# Patient Record
Sex: MALE | Race: WHITE | NOT HISPANIC OR LATINO | URBAN - METROPOLITAN AREA
[De-identification: names, ages, dates, MRNs, and addresses within clinical notes are randomized per-mention and may not be internally consistent; named-entity substitution may affect disease eponyms.]

---

## 2017-09-29 ENCOUNTER — EMERGENCY (EMERGENCY)
Facility: HOSPITAL | Age: 57
LOS: 1 days | Discharge: HOME | End: 2017-09-29
Attending: EMERGENCY MEDICINE | Admitting: EMERGENCY MEDICINE
Payer: COMMERCIAL

## 2017-09-29 VITALS
DIASTOLIC BLOOD PRESSURE: 35 MMHG | RESPIRATION RATE: 16 BRPM | TEMPERATURE: 99 F | OXYGEN SATURATION: 100 % | HEART RATE: 58 BPM | SYSTOLIC BLOOD PRESSURE: 125 MMHG

## 2017-09-29 DIAGNOSIS — S01.81XA LACERATION WITHOUT FOREIGN BODY OF OTHER PART OF HEAD, INITIAL ENCOUNTER: ICD-10-CM

## 2017-09-29 DIAGNOSIS — S01.511A LACERATION WITHOUT FOREIGN BODY OF LIP, INITIAL ENCOUNTER: ICD-10-CM

## 2017-09-29 DIAGNOSIS — S40.212A ABRASION OF LEFT SHOULDER, INITIAL ENCOUNTER: ICD-10-CM

## 2017-09-29 PROCEDURE — 12013 RPR F/E/E/N/L/M 2.6-5.0 CM: CPT | Mod: 59

## 2017-09-29 PROCEDURE — 70450 CT HEAD/BRAIN W/O DYE: CPT | Mod: 26

## 2017-09-29 PROCEDURE — 99285 EMERGENCY DEPT VISIT HI MDM: CPT | Mod: 25

## 2017-09-29 PROCEDURE — 71010: CPT | Mod: 26

## 2017-09-29 PROCEDURE — 12051 INTMD RPR FACE/MM 2.5 CM/<: CPT

## 2017-09-29 PROCEDURE — 70486 CT MAXILLOFACIAL W/O DYE: CPT | Mod: 26
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
## 2017-09-29 NOTE — ED PROVIDER NOTE - MUSCULOSKELETAL, MLM
Mild tenderness to anterior lateral aspect of left shoulder. Spine appears normal, range of motion is not limited, no muscle or joint tenderness Mild tenderness to anterior lateral aspect of left shoulder (abrasion present)   ROM normal passive and active. Spine appears normal, range of motion is not limited, no muscle or joint tenderness

## 2017-09-29 NOTE — ED PROVIDER NOTE - CARE PLAN
Principal Discharge DX:	Fall (on) (from) unspecified stairs and steps, initial encounter  Secondary Diagnosis:	Lip injury, initial encounter

## 2017-09-29 NOTE — ED SUB INTERN NOTE - ENMT NEGATIVE STATEMENT, MLM
Ears: no ear pain and no hearing problems.Nose: no nasal congestion and no nasal drainage.  Noepistaxis .Mouth/Throat: no dysphagia, no hoarseness and no throat pain.Neck: no lumps, no pain, no stiffness and no swollen glands.

## 2017-09-29 NOTE — ED PROVIDER NOTE - SKIN, MLM
3cm abrasion to the anterior lateral aspect of left shoulder. 2cm lip laceration to the left upper lip extending through the vermilion boarder and 2cm into the mucosal surface. 1cm laceration to the supraorbital area of the left temple. 3cm abrasion to the anterior lateral aspect of left shoulder. 3 cm abraded, contused lip laceration to the left upper lip extending through the vermilion boarder and 2cm into the mucosal surface. 1cm laceration to the supraorbital area of the left temple.

## 2017-09-29 NOTE — ED PROCEDURE NOTE - CPROC ED TIME OUT STATEMENT1
“Patient's name, , procedure and correct site were confirmed during the [place] Timeout.”
“Patient's name, , procedure and correct site were confirmed during the [place] Timeout.”

## 2017-09-29 NOTE — ED SUB INTERN NOTE - GENERIC SYMPTOMS POSITIVE
pt presenting with mechanical fall due to malfunctioning assistive device, head trauma without LOC, able to ambulate without difficulty s/p fall full ROM of all extremities

## 2017-09-29 NOTE — ED SUB INTERN NOTE - GENERIC SYMPTOMS NEGATIVE FT
denies concussive symptoms, no c/w tenderness, no dizziness prior to fall, no c/p, no neck/spinal tenderness, no pelvic instability

## 2017-09-29 NOTE — ED PROVIDER NOTE - PROGRESS NOTE DETAILS
imaging unremarkable.  See procedure notes for detail.  Fall precautions discussed in detail.  This is his first fall in > 1 year.  Uses a cane to walk. Wound care discussed in detail as well.  Conservative management discussed with the patient in detail.  Close PMD follow up encouraged.  Strict ED return instructions discussed in detail and patient given the opportunity to ask any questions about their discharge diagnosis and instructions

## 2017-09-29 NOTE — ED PROVIDER NOTE - NEURO NEGATIVE STATEMENT, MLM
no loss of consciousness, no gait abnormality, no headache, no sensory deficits, and no weakness. no loss of consciousness,  no headache, no sensory deficits, and no weakness.

## 2017-09-29 NOTE — ED PROCEDURE NOTE - NS ED PROCEDURE ASSISTED BY
The procedure was performed independently

## 2017-09-29 NOTE — ED PROCEDURE NOTE - CPROC ED LACER REPAIR DETAIL1
No foreign body No foreign body/Extensive debridement was performed./The wound was explored to base in bloodless field. The wound was explored to base in bloodless field.

## 2017-09-29 NOTE — ED PROCEDURE NOTE - CPROC ED POST PROC CARE GUIDE1
Instructed patient/caregiver to follow-up with primary care physician./Keep the cast/splint/dressing clean and dry./Verbal/written post procedure instructions were given to patient/caregiver./Instructed patient/caregiver regarding signs and symptoms of infection.
Keep the cast/splint/dressing clean and dry./Verbal/written post procedure instructions were given to patient/caregiver./Instructed patient/caregiver to follow-up with primary care physician./Instructed patient/caregiver regarding signs and symptoms of infection.
Keep the cast/splint/dressing clean and dry./Instructed patient/caregiver regarding signs and symptoms of infection./Verbal/written post procedure instructions were given to patient/caregiver./Instructed patient/caregiver to follow-up with primary care physician.

## 2017-09-29 NOTE — ED PROVIDER NOTE - OBJECTIVE STATEMENT
58 yo M with presents s/p mechanical fall on the street c/o lip laceration. Pt states was walking when can got caught causing him to fall and land on left side of face, which now has abrasions to left side of the face, laceration to left upper lip, and mild shoulder pain. Denies LOC, N/V, HA, loose or painful teeth, fever, or chills. Pt notes tetanus is up to date. 56 yo M with presents s/p mechanical fall on the street c/o lip laceration. Pt states was walking when can got caught causing him to fall and land on left side of face, which now has abrasions to left side of the face, laceration to left upper lip, and mild shoulder pain. Denies LOC, N/V, HA, loose or painful teeth, fever, or chills. Pt notes tetanus is up to date.  NO premonitory symptoms.  Denies blood thinning medication.

## 2017-11-14 PROBLEM — Z00.00 ENCOUNTER FOR PREVENTIVE HEALTH EXAMINATION: Status: ACTIVE | Noted: 2017-11-14

## 2018-03-20 ENCOUNTER — APPOINTMENT (OUTPATIENT)
Dept: NEUROLOGY | Facility: CLINIC | Age: 58
End: 2018-03-20
Payer: SELF-PAY

## 2018-03-20 VITALS
DIASTOLIC BLOOD PRESSURE: 66 MMHG | SYSTOLIC BLOOD PRESSURE: 105 MMHG | HEART RATE: 57 BPM | WEIGHT: 173 LBS | BODY MASS INDEX: 24.77 KG/M2 | OXYGEN SATURATION: 95 % | HEIGHT: 70 IN

## 2018-03-20 DIAGNOSIS — G40.909 EPILEPSY, UNSPECIFIED, NOT INTRACTABLE, W/OUT STATUS EPILEPTICUS: ICD-10-CM

## 2018-03-20 PROCEDURE — 95816 EEG AWAKE AND DROWSY: CPT

## 2018-03-20 PROCEDURE — 99213 OFFICE O/P EST LOW 20 MIN: CPT

## 2018-06-05 RX ORDER — TAMSULOSIN HYDROCHLORIDE 0.4 MG/1
0.4 CAPSULE ORAL
Refills: 0 | Status: ACTIVE | COMMUNITY

## 2018-06-05 RX ORDER — AMANTADINE HYDROCHLORIDE 100 1/1
100 TABLET ORAL
Refills: 0 | Status: ACTIVE | COMMUNITY

## 2018-06-05 RX ORDER — LAMOTRIGINE 150 MG/1
150 TABLET ORAL
Qty: 60 | Refills: 11 | Status: ACTIVE | COMMUNITY
Start: 1900-01-01 | End: 1900-01-01

## 2018-06-05 RX ORDER — GUANFACINE 1 MG/1
1 TABLET, EXTENDED RELEASE ORAL
Refills: 0 | Status: ACTIVE | COMMUNITY

## 2018-08-03 PROBLEM — S06.9X9A UNSPECIFIED INTRACRANIAL INJURY WITH LOSS OF CONSCIOUSNESS OF UNSPECIFIED DURATION, INITIAL ENCOUNTER: Chronic | Status: ACTIVE | Noted: 2017-09-29

## 2018-08-03 PROBLEM — R56.9 UNSPECIFIED CONVULSIONS: Chronic | Status: ACTIVE | Noted: 2017-09-29

## 2018-08-07 ENCOUNTER — APPOINTMENT (OUTPATIENT)
Dept: NEUROLOGY | Facility: CLINIC | Age: 58
End: 2018-08-07
Payer: SELF-PAY

## 2018-08-07 VITALS
DIASTOLIC BLOOD PRESSURE: 74 MMHG | OXYGEN SATURATION: 96 % | HEART RATE: 66 BPM | WEIGHT: 170 LBS | SYSTOLIC BLOOD PRESSURE: 116 MMHG | BODY MASS INDEX: 25.18 KG/M2 | HEIGHT: 69 IN

## 2018-08-07 LAB
BASOPHILS # BLD AUTO: 0.01 K/UL
BASOPHILS NFR BLD AUTO: 0.3 %
EOSINOPHIL # BLD AUTO: 0.06 K/UL
EOSINOPHIL NFR BLD AUTO: 1.6 %
HCT VFR BLD CALC: 47.8 %
HGB BLD-MCNC: 15.7 G/DL
IMM GRANULOCYTES NFR BLD AUTO: 0.3 %
LYMPHOCYTES # BLD AUTO: 0.78 K/UL
LYMPHOCYTES NFR BLD AUTO: 21.4 %
MAN DIFF?: NORMAL
MCHC RBC-ENTMCNC: 29.8 PG
MCHC RBC-ENTMCNC: 32.8 GM/DL
MCV RBC AUTO: 90.9 FL
MONOCYTES # BLD AUTO: 0.33 K/UL
MONOCYTES NFR BLD AUTO: 9.1 %
NEUTROPHILS # BLD AUTO: 2.45 K/UL
NEUTROPHILS NFR BLD AUTO: 67.3 %
PLATELET # BLD AUTO: 246 K/UL
RBC # BLD: 5.26 M/UL
RBC # FLD: 12.9 %
WBC # FLD AUTO: 3.64 K/UL

## 2018-08-07 PROCEDURE — 99214 OFFICE O/P EST MOD 30 MIN: CPT

## 2018-08-08 LAB
ALBUMIN SERPL ELPH-MCNC: 4.9 G/DL
ALP BLD-CCNC: 78 U/L
ALT SERPL-CCNC: 16 U/L
ANION GAP SERPL CALC-SCNC: 13 MMOL/L
AST SERPL-CCNC: 17 U/L
BILIRUB SERPL-MCNC: 0.4 MG/DL
BUN SERPL-MCNC: 21 MG/DL
CALCIUM SERPL-MCNC: 9.6 MG/DL
CHLORIDE SERPL-SCNC: 103 MMOL/L
CO2 SERPL-SCNC: 27 MMOL/L
CREAT SERPL-MCNC: 1 MG/DL
FOLATE SERPL-MCNC: 8.8 NG/ML
GLUCOSE SERPL-MCNC: 80 MG/DL
POTASSIUM SERPL-SCNC: 5.5 MMOL/L
PROT SERPL-MCNC: 7 G/DL
SODIUM SERPL-SCNC: 143 MMOL/L
TSH SERPL-ACNC: 3.58 UIU/ML
VIT B12 SERPL-MCNC: <150 PG/ML

## 2018-08-09 LAB — LAMOTRIGINE SERPL-MCNC: 6.5 MCG/ML

## 2018-10-05 ENCOUNTER — EMERGENCY (EMERGENCY)
Facility: HOSPITAL | Age: 58
LOS: 1 days | Discharge: LEFT BEFORE DISCHARGE | End: 2018-10-05
Admitting: EMERGENCY MEDICINE

## 2018-10-05 VITALS
DIASTOLIC BLOOD PRESSURE: 72 MMHG | SYSTOLIC BLOOD PRESSURE: 107 MMHG | TEMPERATURE: 98 F | HEART RATE: 50 BPM | OXYGEN SATURATION: 98 % | RESPIRATION RATE: 16 BRPM

## 2018-10-05 DIAGNOSIS — Z04.3 ENCOUNTER FOR EXAMINATION AND OBSERVATION FOLLOWING OTHER ACCIDENT: ICD-10-CM

## 2018-10-05 NOTE — ED ADULT NURSE NOTE - OBJECTIVE STATEMENT
posterior head pain s/p striking head on a chair leg, denies loc, no s/s of distress, awaiting provider eval

## 2018-10-05 NOTE — ED ADULT TRIAGE NOTE - CHIEF COMPLAINT QUOTE
Patient s/p fall after getting up form around his desk, hitting his head. Denies any LOC or blood thinner use. Reports his left leg got  weak an he fell, endorse left lower ext weakness after hip surgery 2 yrs ago. Patient with h/o TBI

## 2018-10-05 NOTE — ED ADULT NURSE NOTE - NSIMPLEMENTINTERV_GEN_ALL_ED
Implemented All Universal Safety Interventions:  [place] to call system. Call bell, personal items and telephone within reach. Instruct patient to call for assistance. Room bathroom lighting operational. Non-slip footwear when patient is off stretcher. Physically safe environment: no spills, clutter or unnecessary equipment. Stretcher in lowest position, wheels locked, appropriate side rails in place.

## 2018-10-05 NOTE — ED ADULT NURSE NOTE - CHIEF COMPLAINT QUOTE
"Problem: Goal Outcome Summary  Goal: Goal Outcome Summary  Outcome: No Change  Patient VSS on RA. Patient tired but arouses to voice. He will look at me when I ask questions however would only answer \"no\" to pain. He wouldn't answer any other questions so BEVERLY orientation, or mental status on initial assessment. Patient has had a flat affect and quiet. LS diminished with shallow breaths. Abdomen concaved, hypoactive bowel sounds. This am patient alert and oriented to self patient states he can't remember.When reorienting patient got tearful. Patient has agreed  to drink, eat and take his medications this am.. IVF. Garcia is patent with adequate output patient states penis is tender to touch from garcia but declines intervention . Will continue monitoring      " Patient s/p fall after getting up form around his desk, hitting his head. Denies any LOC or blood thinner use. Reports his left leg got  weak an he fell, endorse left lower ext weakness after hip surgery 2 yrs ago. Patient with h/o TBI

## 2018-12-13 ENCOUNTER — OTHER (OUTPATIENT)
Age: 58
End: 2018-12-13

## 2019-01-10 ENCOUNTER — OTHER (OUTPATIENT)
Age: 59
End: 2019-01-10

## 2019-05-30 NOTE — ED PROCEDURE NOTE - NS_ATTENDINGSCRIBE_ED_ALL_ED
CHIEF COMPLAINT: Left Foot Surgical Postop    PROBLEMS:  FH DIABETES (ICD-V18.0) ([id number])    PATIENT REPORTED MEDICATIONS:  MELOXICAM TABLET (MELOXICAM TABS)   TRAMADOL HCL 50 MG ORAL TABLET (TRAMADOL HCL) 1-2 tabs q q 8-12 hrs prn pain; Route: ORAL  CITALOPRAM HYDROBROMIDE TABLET (CITALOPRAM HYDROBROMIDE TABS) 1 tab PO daily    PATIENT REPORTED ALLERGIES:  PENICILLIN (PENICILLIN V POTASSIUM) (SevereReaction: No reaction details noted)  ASPIRIN (SevereReaction: No reaction details noted)  PERCOCET (SevereReaction: No reaction details noted)      HISTORY OF PRESENT ILLNESS:    The patient is here six days out from gastroc recession, Achilles tendon debridement and posterior heel spur resection on the left.  Doing well.  No acute concerns.    PAST MEDICAL HISTORY:    Possible Arthritis    PAST ORTHOPEDIC SURGICAL HISTORY:    Posterior Heel Spur Resection, Achilles Tendon Debridement and Repair, and Gastroc Recession 2019, Melecio Moore DPM  Left Achilles Tendon Debridement And Repair; Heel Spur Resection Posterior Heel 2017, Melecio Moore DPM  Shoulder Surgery x4  Right Hand Surgery x5  Right Knee    PAST SURGICAL HISTORY:    Appendectomy  Cholecystectomy  Kidney Stone Surgery  Ablation  Tubal Ligation    FAMILY HISTORY:    Father:  , Heart Failure, Diabetes  Mother:  , Cancer, Blood Clots    SOCIAL HISTORY:   Occupation:  DSP  Marital Status:    Alcohol Use:  No  Tobacco Use:  Former  Secondhand Smoke Exposure:  No  History of HIV:  No  History of Hepatitis:  No    PHYSICAL EXAMINATION:    Surgical site well-healed.  Sutures removed and Steri-Strips applied.  She already has significant improvement in symptoms.  No overt concerns to the surgical sites.  It is well-coapted.  CMS is intact.    ASSESSMENT:    Six days out from gastroc recession, Achilles tendon debridement and posterior heel spur resection on the left.  Doing well.    PLAN:   Discuss progression of 
postoperative course.  At this point she was put in a compression sock and a boot.  She can weight bear as tolerated in the boot.  She will stay in the boot for a month.  I will see her back at that time and progress as able from there.    Dictated by Melecio Moore DPM  cc:  DO Dr. Oscar Robin at [facility]    D:  05/23/2019  T:  05/29/2019    Typed and/or reviewed and corrected by signing  below, and sent to the Physician for final review and signature.    This report was created using voice recording software and computer-generated templates. Although every effort has been made to review for and eliminate errors, some errors may still occur.         Electronically signed by Walter Dobbs -  on 05/30/2019 at 12:56 PM    Electronically signed by Melecio Moore DPM on 05/30/2019 at 1:21 PM  
I personally performed the service described in the documentation recorded by the scribe in my presence, and it accurately and completely records my words and actions.

## 2019-08-02 ENCOUNTER — OTHER (OUTPATIENT)
Age: 59
End: 2019-08-02

## 2019-12-03 ENCOUNTER — APPOINTMENT (OUTPATIENT)
Dept: NEUROLOGY | Facility: CLINIC | Age: 59
End: 2019-12-03

## 2020-02-21 ENCOUNTER — EMERGENCY (EMERGENCY)
Facility: HOSPITAL | Age: 60
LOS: 1 days | Discharge: HOME | End: 2020-02-21
Attending: EMERGENCY MEDICINE | Admitting: EMERGENCY MEDICINE
Payer: COMMERCIAL

## 2020-02-21 VITALS
OXYGEN SATURATION: 95 % | HEART RATE: 51 BPM | SYSTOLIC BLOOD PRESSURE: 104 MMHG | WEIGHT: 179.9 LBS | RESPIRATION RATE: 17 BRPM | DIASTOLIC BLOOD PRESSURE: 69 MMHG | TEMPERATURE: 97 F | HEIGHT: 68 IN

## 2020-02-21 PROCEDURE — 70450 CT HEAD/BRAIN W/O DYE: CPT | Mod: 26

## 2020-02-21 PROCEDURE — 99053 MED SERV 10PM-8AM 24 HR FAC: CPT

## 2020-02-21 PROCEDURE — 99284 EMERGENCY DEPT VISIT MOD MDM: CPT
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
## 2020-02-21 NOTE — ED PROVIDER NOTE - CPE EDP ENMT NORM
- - - Benzoyl Peroxide Pregnancy And Lactation Text: This medication is Pregnancy Category C. It is unknown if benzoyl peroxide is excreted in breast milk.

## 2020-02-21 NOTE — ED PROVIDER NOTE - PATIENT PORTAL LINK FT
You can access the FollowMyHealth Patient Portal offered by [facility] by registering at the following website: [email or web address]. By joining [employer]’s FollowMyHealth portal, you will also be able to view your health information using other applications (apps) compatible with our system.
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
## 2020-02-21 NOTE — ED ADULT NURSE NOTE - CHPI ED NUR SYMPTOMS NEG
no blurred vision/no dizziness/no loss of consciousness/no change in level of consciousness/no confusion/no vomiting/no weakness/no fever/no nausea/no numbness

## 2020-02-21 NOTE — ED ADULT TRIAGE NOTE - CHIEF COMPLAINT QUOTE
Biba for mechanical slip and fall OOB with + head injury. PT A/Ox4 during triage, denies LOC. No blood thinner use. PT states he was able to get up by himself after fall.  PT reports "mild headache". Denies any other pain or injuries. States he was advised by PMD to come to ER. [facility] TBI 1987.
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
## 2020-02-25 DIAGNOSIS — S09.90XA UNSPECIFIED INJURY OF HEAD, INITIAL ENCOUNTER: ICD-10-CM
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
## 2020-04-01 ENCOUNTER — APPOINTMENT (OUTPATIENT)
Dept: NEUROLOGY | Facility: CLINIC | Age: 60
End: 2020-04-01

## 2020-06-04 NOTE — ED SUB INTERN NOTE - SKIN LOCATION #2
COVID-19 SCREENING    Do you or any of your household members have the following symptoms:  Fever >100.4*F or >38.0*C: No    New or worsening cough, shortness of breath, or sore throat: No    New onset of nausea, vomiting or diarrhea: No    New onset of loss of taste or smell, chills, repeated shaking with chills, muscle pain, or headache: No    Have you or a household member tested positive for COVID-19 in the last 14 days?: No    Emergent COVID-19 Symptoms requiring Nurse Triage:  Trouble breathing, Persistent pain or pressure in the chest, New confusion, Inability to wake or stay awake, Bluish lips or face    If any of the above questions are positive and the patient is not having emergent symptoms - order COVID19 lab test, register, & schedule patient for community testing.     DO NOT schedule any other appointments unless directed by a facility or provider (ex:  being discharged from hospital needing 1st pediatric appointment, TIA clinic patients, etc). Appointments can be canceled, but DO NOT schedule/reschedule patient requested appointments.       
eye

## 2021-12-12 NOTE — ED PROCEDURE NOTE - EBL
INCOMPLETE NOTE
minimal
minimal
minimal

## 2022-07-27 NOTE — ED PROVIDER NOTE - PRINCIPAL DIAGNOSIS
FREE:[LAST:[[facility]],PHONE:[phone]],FAX:[(   )    -]] Fall (on) (from) unspecified stairs and steps, initial encounter

## 2023-04-18 ENCOUNTER — NON-APPOINTMENT (OUTPATIENT)
Age: 63
End: 2023-04-18

## 2023-04-20 ENCOUNTER — NON-APPOINTMENT (OUTPATIENT)
Age: 63
End: 2023-04-20

## 2024-09-28 ENCOUNTER — EMERGENCY (EMERGENCY)
Facility: HOSPITAL | Age: 64
LOS: 1 days | Discharge: HOME | End: 2024-09-28
Attending: STUDENT IN AN ORGANIZED HEALTH CARE EDUCATION/TRAINING PROGRAM | Admitting: STUDENT IN AN ORGANIZED HEALTH CARE EDUCATION/TRAINING PROGRAM
Payer: COMMERCIAL

## 2024-09-28 VITALS
TEMPERATURE: 98 F | HEART RATE: 66 BPM | DIASTOLIC BLOOD PRESSURE: 74 MMHG | OXYGEN SATURATION: 98 % | RESPIRATION RATE: 18 BRPM | SYSTOLIC BLOOD PRESSURE: 132 MMHG

## 2024-09-28 VITALS
TEMPERATURE: 98 F | HEART RATE: 53 BPM | SYSTOLIC BLOOD PRESSURE: 137 MMHG | RESPIRATION RATE: 18 BRPM | OXYGEN SATURATION: 97 % | DIASTOLIC BLOOD PRESSURE: 75 MMHG

## 2024-09-28 PROCEDURE — 99284 EMERGENCY DEPT VISIT MOD MDM: CPT

## 2024-09-28 PROCEDURE — 99283 EMERGENCY DEPT VISIT LOW MDM: CPT

## 2024-09-28 PROCEDURE — 82962 GLUCOSE BLOOD TEST: CPT

## 2024-09-28 NOTE — ED PROVIDER NOTE - CLINICAL SUMMARY MEDICAL DECISION MAKING FREE TEXT BOX
64-year-old male presenting after mechanical fall, no head trauma or LOC, requesting to leave, currently pain-free, discussed with his , stable for discharge, given return precautions.

## 2024-09-28 NOTE — ED PROVIDER NOTE - PHYSICAL EXAMINATION
general: Well appearing, in no acute distress  HEENT: Normocephalic, atraumatic, extraocular movements intact  CV: Regular rate  Pulm: No respiratory distress, no tachypnea  Abd: Flat, no gross distension, soft, nontender, no rebound or guarding  Ext: warm and well perfused, no bony tenderness of hip or pelvis, full range of motion  Skin: No gross rashes or lesions  Neuro: Alert and oriented, moving all extremities, ambulating without assistance at baseline
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
## 2024-09-28 NOTE — ED PROVIDER NOTE - PATIENT PORTAL LINK FT
You can access the FollowMyHealth Patient Portal offered by [facility] by registering at the following website: [email or web address]. By joining [employer]’s FollowMyHealth portal, you will also be able to view your health information using other applications (apps) compatible with our system.

## 2024-09-28 NOTE — ED ADULT NURSE NOTE - OBJECTIVE STATEMENT
Patient w/ PMHx TBI, arrives w/ bilateral lower leg brace and HHA at bedside, states mechanical slip and fall at home, landed on his buttocks, denies hitting head or LOC, not on blood thinners. States has mild lower back pain.

## 2024-09-28 NOTE — ED ADULT NURSE NOTE - NSFALLRISKINTERV_ED_ALL_ED

## 2024-09-28 NOTE — ED PROVIDER NOTE - NS ED ROS FT
16w3d    Patient calls to report \"Saturday I took a longer bath about an hour 40 minutes, toward the end the water started getting cold and I got out and then couldn't get warm and my legs became crampy.  Now I feel like I have a sinus infection and have a cough.\"  Patient notified of pregnancy safe medications to take from approved medication list and notified should be seen in urgent care if sinus infection symptoms persist.  Patient also did report small light pink spotting noticed once today when wiping after using the bathroom. Patient denies recent intercourse or bowel movement but has been coughing lots. Patient is not needing to wear a pad.  Writer encouraged patient to continue to monitor and call if bleeding increases or changes.  
Constitutional: No fever or chills  Eyes: No discharge or drainage  Ears, Nose, Mouth, Throat: No nasal discharge, no sore throat  Cardiovascular: No chest pain, no palpitations  Respiratory: No shortness of breath, no cough  Gastrointestinal: No nausea or vomiting, no abdominal pain, no diarrhea or constipation  Musculoskeletal: No joint pain, no swelling  Skin: No rashes or lesions  Neurological: No numbness, weakness, tingling, no headache

## 2024-09-28 NOTE — ED ADULT TRIAGE NOTE - CHIEF COMPLAINT QUOTE
63 y/o male BIBEMS for evaluation of pain to buttocks and lower back after fall today at 9 AM, Pt with hx of TIB, wears leg braces, walks with walker. As per caregiver patient was undecided to come to hospital stating "I want to go to  hospital" then "no I don't want to go"

## 2024-09-28 NOTE — ED PROVIDER NOTE - NSFOLLOWUPINSTRUCTIONS_ED_ALL_ED_FT
Please take Tylenol or Motrin as needed, apply ice to the area.  Return for any worsening symptoms.  Otherwise, please follow-up with your primary physician.

## 2024-09-28 NOTE — ED ADULT NURSE REASSESSMENT NOTE - NS ED NURSE REASSESS COMMENT FT1
Patient oriented to self, place and situation, states not in any pain and wants to leave.  Vital signs stable.  Dr. Galindo spoke w/ patient's health care manager about discharge.  Patient able to ambulate as per baseline, HHA at bedside w/ patient.  Disposition pending.

## 2024-10-01 DIAGNOSIS — M54.50 LOW BACK PAIN, UNSPECIFIED: ICD-10-CM
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
## 2024-10-08 ENCOUNTER — EMERGENCY (EMERGENCY)
Facility: HOSPITAL | Age: 64
LOS: 1 days | Discharge: LEFT BEFORE DISCHARGE | End: 2024-10-08
Attending: EMERGENCY MEDICINE | Admitting: EMERGENCY MEDICINE
Payer: COMMERCIAL

## 2024-10-08 VITALS
DIASTOLIC BLOOD PRESSURE: 64 MMHG | OXYGEN SATURATION: 92 % | TEMPERATURE: 98 F | HEART RATE: 55 BPM | SYSTOLIC BLOOD PRESSURE: 102 MMHG | RESPIRATION RATE: 18 BRPM | WEIGHT: 184.97 LBS

## 2024-10-08 DIAGNOSIS — R06.02 SHORTNESS OF BREATH: ICD-10-CM

## 2024-10-08 DIAGNOSIS — I44.0 ATRIOVENTRICULAR BLOCK, FIRST DEGREE: ICD-10-CM

## 2024-10-08 DIAGNOSIS — Z53.29 PROCEDURE AND TREATMENT NOT CARRIED OUT BECAUSE OF PATIENT'S DECISION FOR OTHER REASONS: ICD-10-CM

## 2024-10-08 DIAGNOSIS — F32.A DEPRESSION, UNSPECIFIED: ICD-10-CM

## 2024-10-08 LAB
ALBUMIN SERPL ELPH-MCNC: 4 G/DL — SIGNIFICANT CHANGE UP (ref 3.3–5)
ALP SERPL-CCNC: 109 U/L — SIGNIFICANT CHANGE UP (ref 40–120)
ALT FLD-CCNC: 17 U/L — SIGNIFICANT CHANGE UP (ref 10–45)
ANION GAP SERPL CALC-SCNC: 10 MMOL/L — SIGNIFICANT CHANGE UP (ref 5–17)
AST SERPL-CCNC: 16 U/L — SIGNIFICANT CHANGE UP (ref 10–40)
BASE EXCESS BLDV CALC-SCNC: 6.9 MMOL/L — HIGH (ref -2–3)
BASOPHILS # BLD AUTO: 0.02 K/UL — SIGNIFICANT CHANGE UP (ref 0–0.2)
BASOPHILS NFR BLD AUTO: 0.3 % — SIGNIFICANT CHANGE UP (ref 0–2)
BILIRUB SERPL-MCNC: 0.2 MG/DL — SIGNIFICANT CHANGE UP (ref 0.2–1.2)
BUN SERPL-MCNC: 14 MG/DL — SIGNIFICANT CHANGE UP (ref 7–23)
CALCIUM SERPL-MCNC: 8.7 MG/DL — SIGNIFICANT CHANGE UP (ref 8.4–10.5)
CHLORIDE SERPL-SCNC: 102 MMOL/L — SIGNIFICANT CHANGE UP (ref 96–108)
CO2 BLDV-SCNC: 34 MMOL/L — HIGH (ref 22–26)
CO2 SERPL-SCNC: 28 MMOL/L — SIGNIFICANT CHANGE UP (ref 22–31)
CREAT SERPL-MCNC: 0.89 MG/DL — SIGNIFICANT CHANGE UP (ref 0.5–1.3)
EGFR: 96 ML/MIN/1.73M2 — SIGNIFICANT CHANGE UP
EOSINOPHIL # BLD AUTO: 0.1 K/UL — SIGNIFICANT CHANGE UP (ref 0–0.5)
EOSINOPHIL NFR BLD AUTO: 1.5 % — SIGNIFICANT CHANGE UP (ref 0–6)
FLUAV AG NPH QL: SIGNIFICANT CHANGE UP
FLUBV AG NPH QL: SIGNIFICANT CHANGE UP
GAS PNL BLDV: SIGNIFICANT CHANGE UP
GLUCOSE SERPL-MCNC: 103 MG/DL — HIGH (ref 70–99)
HCO3 BLDV-SCNC: 32 MMOL/L — HIGH (ref 22–29)
HCT VFR BLD CALC: 39.8 % — SIGNIFICANT CHANGE UP (ref 39–50)
HGB BLD-MCNC: 13.2 G/DL — SIGNIFICANT CHANGE UP (ref 13–17)
IMM GRANULOCYTES NFR BLD AUTO: 0.3 % — SIGNIFICANT CHANGE UP (ref 0–0.9)
LYMPHOCYTES # BLD AUTO: 0.57 K/UL — LOW (ref 1–3.3)
LYMPHOCYTES # BLD AUTO: 8.4 % — LOW (ref 13–44)
MAGNESIUM SERPL-MCNC: 2.2 MG/DL — SIGNIFICANT CHANGE UP (ref 1.6–2.6)
MCHC RBC-ENTMCNC: 29.6 PG — SIGNIFICANT CHANGE UP (ref 27–34)
MCHC RBC-ENTMCNC: 33.2 GM/DL — SIGNIFICANT CHANGE UP (ref 32–36)
MCV RBC AUTO: 89.2 FL — SIGNIFICANT CHANGE UP (ref 80–100)
MONOCYTES # BLD AUTO: 0.47 K/UL — SIGNIFICANT CHANGE UP (ref 0–0.9)
MONOCYTES NFR BLD AUTO: 6.9 % — SIGNIFICANT CHANGE UP (ref 2–14)
NEUTROPHILS # BLD AUTO: 5.62 K/UL — SIGNIFICANT CHANGE UP (ref 1.8–7.4)
NEUTROPHILS NFR BLD AUTO: 82.6 % — HIGH (ref 43–77)
NRBC # BLD: 0 /100 WBCS — SIGNIFICANT CHANGE UP (ref 0–0)
NT-PROBNP SERPL-SCNC: 65 PG/ML — SIGNIFICANT CHANGE UP (ref 0–300)
PCO2 BLDV: 49 MMHG — SIGNIFICANT CHANGE UP (ref 42–55)
PH BLDV: 7.43 — SIGNIFICANT CHANGE UP (ref 7.32–7.43)
PLATELET # BLD AUTO: 253 K/UL — SIGNIFICANT CHANGE UP (ref 150–400)
PO2 BLDV: 62 MMHG — HIGH (ref 25–45)
POTASSIUM SERPL-MCNC: 3.8 MMOL/L — SIGNIFICANT CHANGE UP (ref 3.5–5.3)
POTASSIUM SERPL-SCNC: 3.8 MMOL/L — SIGNIFICANT CHANGE UP (ref 3.5–5.3)
PROT SERPL-MCNC: 6.5 G/DL — SIGNIFICANT CHANGE UP (ref 6–8.3)
RBC # BLD: 4.46 M/UL — SIGNIFICANT CHANGE UP (ref 4.2–5.8)
RBC # FLD: 12.1 % — SIGNIFICANT CHANGE UP (ref 10.3–14.5)
RSV RNA NPH QL NAA+NON-PROBE: SIGNIFICANT CHANGE UP
SAO2 % BLDV: 94.3 % — HIGH (ref 67–88)
SARS-COV-2 RNA SPEC QL NAA+PROBE: SIGNIFICANT CHANGE UP
SODIUM SERPL-SCNC: 140 MMOL/L — SIGNIFICANT CHANGE UP (ref 135–145)
WBC # BLD: 6.8 K/UL — SIGNIFICANT CHANGE UP (ref 3.8–10.5)
WBC # FLD AUTO: 6.8 K/UL — SIGNIFICANT CHANGE UP (ref 3.8–10.5)

## 2024-10-08 PROCEDURE — 36415 COLL VENOUS BLD VENIPUNCTURE: CPT

## 2024-10-08 PROCEDURE — 71046 X-RAY EXAM CHEST 2 VIEWS: CPT

## 2024-10-08 PROCEDURE — 87637 SARSCOV2&INF A&B&RSV AMP PRB: CPT

## 2024-10-08 PROCEDURE — 83735 ASSAY OF MAGNESIUM: CPT

## 2024-10-08 PROCEDURE — 99285 EMERGENCY DEPT VISIT HI MDM: CPT

## 2024-10-08 PROCEDURE — 85025 COMPLETE CBC W/AUTO DIFF WBC: CPT

## 2024-10-08 PROCEDURE — 82803 BLOOD GASES ANY COMBINATION: CPT

## 2024-10-08 PROCEDURE — 99283 EMERGENCY DEPT VISIT LOW MDM: CPT | Mod: 25

## 2024-10-08 PROCEDURE — 71046 X-RAY EXAM CHEST 2 VIEWS: CPT | Mod: 26

## 2024-10-08 PROCEDURE — 83880 ASSAY OF NATRIURETIC PEPTIDE: CPT

## 2024-10-08 PROCEDURE — 80053 COMPREHEN METABOLIC PANEL: CPT

## 2024-10-08 NOTE — ED ADULT NURSE NOTE - OBJECTIVE STATEMENT
pt recently discharged from [facility] today at 6pm sent home and brought to the ED for hypoxia. as per EMS, pt was saturating 80s on room air with cyanotic lips. pt placed on 4L NC saturating at 94%. no use of accessory muscles or retractions noted. respirations are unlabored. no cough noted. as per HHA, pt does not use oxygen at home. denies any complaints, wants to go home.

## 2024-10-08 NOTE — ED ADULT NURSE NOTE - NSFALLUNIVINTERV_ED_ALL_ED
Bed/Stretcher in lowest position, wheels locked, appropriate side rails in place/Call bell, personal items and telephone in reach/Instruct patient to call for assistance before getting out of bed/chair/stretcher/Non-slip footwear applied when patient is off stretcher/[place] to call system/Physically safe environment - no spills, clutter or unnecessary equipment/Purposeful proactive rounding/Room/bathroom lighting operational, light cord in reach

## 2024-10-09 VITALS
TEMPERATURE: 98 F | RESPIRATION RATE: 16 BRPM | SYSTOLIC BLOOD PRESSURE: 125 MMHG | DIASTOLIC BLOOD PRESSURE: 83 MMHG | HEART RATE: 57 BPM | OXYGEN SATURATION: 94 %

## 2024-10-09 RX ORDER — IPRATROPIUM BROMIDE AND ALBUTEROL SULFATE .5; 3 MG/3ML; MG/3ML
3 SOLUTION RESPIRATORY (INHALATION) ONCE
Refills: 0 | Status: COMPLETED | OUTPATIENT
Start: 2024-10-09 | End: 2024-10-09

## 2024-10-09 NOTE — ED PROVIDER NOTE - PROGRESS NOTE DETAILS
Attempted to discussed with patient opportunity to perform advanced imaging continued monitoring albuterol treatment patient refused understands risks versus benefits states he feels fine and he is just anxious wants to go home does not want a be in the hospital DC AMA return precautions given discussed case with patient's caregiver and manager has 24/7 care will monitor home advised caregivers manager to get portable pulse oximeter and check oxygenation and to call EMS if oxygen falls below 93%  or if he becomes dyspneic

## 2024-10-09 NOTE — ED PROVIDER NOTE - NSFOLLOWUPINSTRUCTIONS_ED_ALL_ED_FT
Shortness of Breath, Adult  Shortness of breath is when a person has trouble breathing or when a person feels like she or he is having trouble breathing in enough air. Shortness of breath could be a sign of a medical problem.    Follow these instructions at home:  A sign showing that a person should not smoke.  Pollutants    Do not use any products that contain nicotine or tobacco. These products include cigarettes, chewing tobacco, and vaping devices, such as e-cigarettes. This also includes cigars and pipes. If you need help quitting, ask your health care provider.  Avoid things that can irritate your airways, including:  Smoke. This includes campfire smoke, forest fire smoke, and secondhand smoke from tobacco products. Do not smoke or allow others to smoke in your home.  Mold.  Dust.  Air pollution.  Chemical fumes.  Things that can give you an allergic reaction (allergens) if you have allergies. Common allergens include pollen from grasses or trees and animal dander.  Keep your living space clean and free of mold and dust.  General instructions    Pay attention to any changes in your symptoms.  Take over-the-counter and prescription medicines only as told by your health care provider. This includes oxygen therapy and inhaled medicines.  Rest as needed.  Return to your normal activities as told by your health care provider. Ask your health care provider what activities are safe for you.  Keep all follow-up visits. This is important.  Contact a health care provider if:  Your condition does not improve as soon as expected.  You have a hard time doing your normal activities, even after you rest.  You have new symptoms.  You cannot walk up stairs or exercise the way that you normally do.  Get help right away if:  Your shortness of breath gets worse.  You have shortness of breath when you are resting.  You feel light-headed or you faint.  You have a cough that is not controlled with medicines.  You cough up blood.  You have pain with breathing.  You have pain in your chest, arms, shoulders, or abdomen.  You have a fever.  These symptoms may be an emergency. Get help right away. Call 911.  Do not wait to see if the symptoms will go away.  Do not drive yourself to the hospital.  Summary  Shortness of breath is when a person has trouble breathing enough air. It can be a sign of a medical problem.  Avoid things that irritate your lungs, such as smoking, pollution, mold, and dust.  Pay attention to changes in your symptoms and contact your health care provider if you have a hard time completing daily activities because of shortness of breath.  This information is not intended to replace advice given to you by your health care provider. Make sure you discuss any questions you have with your health care provider.

## 2024-10-09 NOTE — ED PROVIDER NOTE - PHYSICAL EXAMINATION
VITAL SIGNS: I have reviewed nursing notes and confirm.  CONSTITUTIONAL: Well appearing, in no acute distress.  intermittently slurring words when becoming more and more anxious and then back to baseline states that this is his baseline and it is unchanged  SKIN:  warm and dry, no acute rash.   HEAD:  normocephalic, atraumatic.  EYES: EOM intact; conjunctiva and sclera clear.  ENT: No nasal discharge; airway clear.   NECK: Supple.  CARD: S1, S2, Regular rate and rhythm.   RESP:  Clear to auscultation b/l, no wheezes, rales or rhonchi.Speaking in full sentences  ABD: Normal bowel sounds; soft; non-distended; non-tender; no guarding/ rebound.  EXT: Normal ROM. No peripheral edema. Pulses intact and equal b/l.  NEURO: Alert, oriented, grossly unremarkable, intermittently slurs words when becoming anxious has a history of TBI states this is his baseline and is unchanged  PSYCH: Cooperative, mood and affect appropriate. has capacity to make decisions

## 2024-10-09 NOTE — ED PROVIDER NOTE - PATIENT PORTAL LINK FT
You can access the FollowMyHealth Patient Portal offered by [facility] by registering at the following website: [email or web address]. By joining [email or web address]’s FollowMyHealth portal, you will also be able to view your health information using other applications (apps) compatible with our system.

## 2024-10-09 NOTE — ED PROVIDER NOTE - CLINICAL SUMMARY MEDICAL DECISION MAKING FREE TEXT BOX
64-year-old male here for shortness of breath preordered on prior to my evaluation CBC CMP proBNP chest x-ray ordered patient 93% on room air does not feel short of breath does not want any additional treatment offered patient advanced imaging of the chest including CTA chest and albuterol treatment patient refused states he wants to go home has the capacity to do so discussed with caregiver and caregivers manager patient has 24/7 care at home as well as PT caregiver states that patient has been weaker since he got home, caregivers manager states that they have been trying to get the patient into [facility].  Patient refusing all further care will DC home   EKG NSR 51 normal axis intervals first-degree AV block no acute ischemia no STEMI

## 2024-10-09 NOTE — ED PROVIDER NOTE - OBJECTIVE STATEMENT
64-year-old male brought in by ambulance from home after being discharged from [facility] and admission for dysphagia and stroke workup which was negative history of TBI anxiety depression on risperidone called EMS when he got home from [facility] for shortness of breath anxiety BLS crew arrived did not check waveform but noted hypoxia to 84% on room air put on supplemental O2 and brought to ED.  Patient's caregivers and  requested to be taken to [facility] EMS refused and stated to the patient that they have to go to the closest hospital.  Patient denies chest pain shortness of breath when asked what is going on patient states that he had anxiety and that he does not need to be here.  Otherwise feels well.  Wants to go home.

## 2024-11-18 ENCOUNTER — NON-APPOINTMENT (OUTPATIENT)
Age: 64
End: 2024-11-18

## 2024-11-26 NOTE — ED ADULT NURSE NOTE - NSINTERVENTIONOPT_GEN_ALL_ED
Received fax from [facility] pharmacy requesting refill on levothyroxine 150 mcg. Refill sent.  
Hourly Rounding

## 2025-01-11 ENCOUNTER — NON-APPOINTMENT (OUTPATIENT)
Age: 65
End: 2025-01-11

## 2025-02-05 NOTE — ED ADULT NURSE NOTE - PMH
Goal Outcome Evaluation:       Pt admitted for CA/P.PCI due to abnormal stress test and dyspnea on exertion. Pt prepped and ready for procedure. Pt's friend Yuni is here for support.      Christel Valdez RN                    Seizures    Traumatic brain injury

## 2025-02-14 ENCOUNTER — EMERGENCY (EMERGENCY)
Facility: HOSPITAL | Age: 65
LOS: 1 days | Discharge: HOME | End: 2025-02-14
Attending: STUDENT IN AN ORGANIZED HEALTH CARE EDUCATION/TRAINING PROGRAM | Admitting: STUDENT IN AN ORGANIZED HEALTH CARE EDUCATION/TRAINING PROGRAM
Payer: SELF-PAY

## 2025-02-14 VITALS
SYSTOLIC BLOOD PRESSURE: 117 MMHG | TEMPERATURE: 98 F | DIASTOLIC BLOOD PRESSURE: 77 MMHG | HEIGHT: 69 IN | HEART RATE: 60 BPM | RESPIRATION RATE: 18 BRPM | OXYGEN SATURATION: 95 % | WEIGHT: 199.96 LBS

## 2025-02-14 PROCEDURE — 71250 CT THORAX DX C-: CPT | Mod: 26

## 2025-02-14 PROCEDURE — 99284 EMERGENCY DEPT VISIT MOD MDM: CPT

## 2025-02-14 PROCEDURE — 99284 EMERGENCY DEPT VISIT MOD MDM: CPT | Mod: 25

## 2025-02-14 PROCEDURE — 71250 CT THORAX DX C-: CPT | Mod: MC

## 2025-02-14 RX ORDER — ACETAMINOPHEN 160 MG/5ML
1000 SUSPENSION ORAL ONCE
Refills: 0 | Status: COMPLETED | OUTPATIENT
Start: 2025-02-14 | End: 2025-02-14

## 2025-02-14 RX ORDER — IBUPROFEN 600 MG/1
600 TABLET, FILM COATED ORAL ONCE
Refills: 0 | Status: COMPLETED | OUTPATIENT
Start: 2025-02-14 | End: 2025-02-14

## 2025-02-14 RX ADMIN — IBUPROFEN 600 MILLIGRAM(S): 600 TABLET, FILM COATED ORAL at 16:16

## 2025-02-14 RX ADMIN — ACETAMINOPHEN 1000 MILLIGRAM(S): 160 SUSPENSION ORAL at 15:39

## 2025-02-14 NOTE — ED PROVIDER NOTE - NS ED ROS FT
Constitutional: No fever or chills  Eyes: No discharge or drainage  Ears, Nose, Mouth, Throat: No nasal discharge, no sore throat  Cardiovascular: No chest pain, no palpitations, + rib pain  Respiratory: No shortness of breath, no cough  Gastrointestinal: No nausea or vomiting, no abdominal pain, no diarrhea or constipation  Musculoskeletal: No joint pain, no swelling  Skin: No rashes or lesions  Neurological: No numbness, weakness, tingling, no headache  Psychiatric: No depression

## 2025-02-14 NOTE — ED PROVIDER NOTE - PHYSICAL EXAMINATION
general: Well appearing, in no acute distress  HEENT: Normocephalic, atraumatic, extraocular movements intact  CV: Regular rate  Pulm: No respiratory distress, no tachypnea, ttp of l ribs  Abd: Flat, no gross distension, soft, ntnd  Ext: warm and well perfused  Skin: No gross rashes or lesions  Back:  No midline c/t/l spine ttp  Neuro: Alert and oriented, moving all extremities, cn ii-xii intact, motor and sensation inatct bilaterally

## 2025-02-14 NOTE — ED ADULT NURSE NOTE - OBJECTIVE STATEMENT
65M / hx of TBI, anxiety , depression, [facility] ED  [facility] endorsing  rib pain . Patient denies HS/LOC.

## 2025-02-14 NOTE — ED ADULT NURSE NOTE - NSFALLRISKINTERV_ED_ALL_ED

## 2025-02-14 NOTE — ED ADULT TRIAGE NOTE - CHIEF COMPLAINT QUOTE
Pt presents to ED BIBA from home with aide s/p mechanical fall at home and landed on his L rib now c/o pain, denies head injury, pt has hx of TBI has normal slow speech at baseline. Pt A&Ox4, NAD. EKG in prog. Pt normally walks with walker at home.

## 2025-02-14 NOTE — ED PROVIDER NOTE - PATIENT PORTAL LINK FT
You can access the FollowMyHealth Patient Portal offered by [facility] by registering at the following website: [email or web address]. By joining [email or web address]’s FollowMyHealth portal, you will also be able to view your health information using other applications (apps) compatible with our system.

## 2025-02-14 NOTE — ED PROVIDER NOTE - OBJECTIVE STATEMENT
64 yo with ho TBI, anxiety, depression presenting after mechanical fall. Fell onto chest, complaining of L rib pain. No head trauma or LOC. No ha or blurry vision, no cp. No abd pain. No numbness, weakness, tingling.  No neck pain, back pain, extremity pain. No other acute complaints. ROS as above.
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
## 2025-02-17 DIAGNOSIS — F32.A DEPRESSION, UNSPECIFIED: ICD-10-CM
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
## 2025-02-17 DIAGNOSIS — R07.81 PLEURODYNIA: ICD-10-CM

## 2025-02-17 DIAGNOSIS — F41.9 ANXIETY DISORDER, UNSPECIFIED: ICD-10-CM

## 2025-02-17 DIAGNOSIS — S29.9XXA UNSPECIFIED INJURY OF THORAX, INITIAL ENCOUNTER: ICD-10-CM

## 2025-06-09 ENCOUNTER — EMERGENCY (EMERGENCY)
Facility: HOSPITAL | Age: 65
LOS: 1 days | End: 2025-06-09
Attending: STUDENT IN AN ORGANIZED HEALTH CARE EDUCATION/TRAINING PROGRAM | Admitting: EMERGENCY MEDICINE
Payer: SELF-PAY

## 2025-06-09 VITALS
OXYGEN SATURATION: 95 % | TEMPERATURE: 98 F | DIASTOLIC BLOOD PRESSURE: 78 MMHG | RESPIRATION RATE: 16 BRPM | WEIGHT: 190.04 LBS | SYSTOLIC BLOOD PRESSURE: 123 MMHG | HEIGHT: 68 IN | HEART RATE: 62 BPM

## 2025-06-09 PROCEDURE — 99284 EMERGENCY DEPT VISIT MOD MDM: CPT | Mod: 25

## 2025-06-09 PROCEDURE — 72192 CT PELVIS W/O DYE: CPT

## 2025-06-09 PROCEDURE — 70450 CT HEAD/BRAIN W/O DYE: CPT

## 2025-06-09 PROCEDURE — 72192 CT PELVIS W/O DYE: CPT | Mod: 26

## 2025-06-09 PROCEDURE — 70450 CT HEAD/BRAIN W/O DYE: CPT | Mod: 26

## 2025-06-09 PROCEDURE — 99284 EMERGENCY DEPT VISIT MOD MDM: CPT

## 2025-06-09 NOTE — ED ADULT NURSE NOTE - OBJECTIVE STATEMENT
Male aox4 s/p mechanical fall at home while using his walker. Hit left side of head and hip onto floor. Hematoma to left side of head. Denies pain or LOC. Not taking blood thinner. Pt states he has hx of TBI. Changed into hospital gown. HHA at bedside.

## 2025-06-09 NOTE — ED ADULT NURSE NOTE - CHIEF COMPLAINT QUOTE
No
S/p fall 1/2 hour ago, states he fell on his left side, hit his left hip & left side of the head, denies LOC, Pt sustained swelling to left forehead & left hip pain. Denies taking any blood thinners

## 2025-06-09 NOTE — ED ADULT TRIAGE NOTE - CHIEF COMPLAINT QUOTE
S/p fall 1/2 hour ago, states he fell on his left side, hit his left hip & left side of the head, denies LOC, Pt sustained swelling to left forehead & left hip pain. Denies taking any blood thinners

## 2025-06-10 VITALS
TEMPERATURE: 99 F | HEART RATE: 66 BPM | SYSTOLIC BLOOD PRESSURE: 131 MMHG | RESPIRATION RATE: 20 BRPM | DIASTOLIC BLOOD PRESSURE: 82 MMHG | OXYGEN SATURATION: 99 %

## 2025-06-10 NOTE — ED PROVIDER NOTE - CLINICAL SUMMARY MEDICAL DECISION MAKING FREE TEXT BOX
Pt here for non-syncopal fall with head and hip strike  Exam reassuring- pt hip/pelvis stable nontender non deformity able to flex and extend, ambulatiry w/ walker here which is his baseline  Neurologically at baseline, HHA at bedside  Plan for CT head and pelvis to r/o traumatic ICH or hip/pelvic fracture, although low suspicion for hip or pelvic fracture on exam

## 2025-06-10 NOTE — ED PROVIDER NOTE - PHYSICAL EXAMINATION
CONST: nontoxic NAD speaking in full sentences w/ speech deficit due to TBI  HEAD: +small erythema to top of L forehead  EYES: conjunctivae clear, PERRL, EOMI  ENT: mmm  NECK: nttp  CARD: rrr   CHEST: ctab no r/r/w, no stridor/retractions/tripoding, no chest wall tenderness  ABD: soft, nd, nttp, no rebound/guarding  EXT: pelvis stable hips nontender able to flex and extend sensation intact no ecchymosis or hematoma  SKIN: warm, dry  NEURO: awake alert answering questions following commands moving all extremities ambulating w/ walker (baseline)

## 2025-06-10 NOTE — ED PROVIDER NOTE - PATIENT PORTAL LINK FT
You can access the FollowMyHealth Patient Portal offered by [facility] by registering at the following website: [email or web address]. By joining [employer]’s FollowMyHealth portal, you will also be able to view your health information using other applications (apps) compatible with our system.

## 2025-06-10 NOTE — ED PROVIDER NOTE - NSFOLLOWUPINSTRUCTIONS_ED_ALL_ED_FT
Your CT scans of your brain and your pelvis were negative- no brain bleed and no hip or pelvic fractures.    Take tylenol 650mg every 4 hours as needed for pain, and ibuprofen 400mg every 6 hours as needed for pain.  Follow up with your doctor, Return to the ER for any new or worsening symptoms

## 2025-06-10 NOTE — ED PROVIDER NOTE - OBJECTIVE STATEMENT
65yM w/ hx TBI ambulates w/ walker, not on AC, comes in for fall- states he tripped and fell on his left side, hit side of his head no LOC, also landed on left hip. Was able to get up and ambulate with assistance but had some pain which resolved. No pain at this time.   Denies trauma to chest/trunk/abdomen, no chest pain SOB back pain abd pain or other complaints.

## 2025-06-12 DIAGNOSIS — L53.9 ERYTHEMATOUS CONDITION, UNSPECIFIED: ICD-10-CM
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
## 2025-06-12 DIAGNOSIS — R55 SYNCOPE AND COLLAPSE: ICD-10-CM
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
## 2025-06-19 NOTE — ED ADULT TRIAGE NOTE - CADM TRG TX PRIOR TO ARRIVAL
Urgent Care Clinic Visit    Chief Complaint   Patient presents with    Urgent Care    URI     Patient reports symptoms started yesterday having sinus pressure, slight headache, body aches only on left side, and throat irritation.                 6/19/2025    12:43 PM   Additional Questions   Roomed by SONAL Hong   Accompanied by Self              none